# Patient Record
Sex: FEMALE | Race: WHITE | NOT HISPANIC OR LATINO | URBAN - METROPOLITAN AREA
[De-identification: names, ages, dates, MRNs, and addresses within clinical notes are randomized per-mention and may not be internally consistent; named-entity substitution may affect disease eponyms.]

---

## 2025-03-24 ENCOUNTER — HOSPITAL ENCOUNTER (OUTPATIENT)
Dept: RADIOLOGY | Facility: CLINIC | Age: 55
Discharge: HOME | End: 2025-03-24
Payer: COMMERCIAL

## 2025-03-24 ENCOUNTER — APPOINTMENT (OUTPATIENT)
Dept: PRIMARY CARE | Facility: CLINIC | Age: 55
End: 2025-03-24

## 2025-03-24 VITALS
OXYGEN SATURATION: 97 % | HEART RATE: 89 BPM | WEIGHT: 178 LBS | DIASTOLIC BLOOD PRESSURE: 80 MMHG | BODY MASS INDEX: 29.66 KG/M2 | HEIGHT: 65 IN | SYSTOLIC BLOOD PRESSURE: 139 MMHG

## 2025-03-24 VITALS — HEIGHT: 65 IN | WEIGHT: 174 LBS | BODY MASS INDEX: 28.99 KG/M2

## 2025-03-24 DIAGNOSIS — Z12.31 VISIT FOR SCREENING MAMMOGRAM: ICD-10-CM

## 2025-03-24 DIAGNOSIS — R03.0 BLOOD PRESSURE ELEVATED WITHOUT HISTORY OF HTN: ICD-10-CM

## 2025-03-24 DIAGNOSIS — Z00.00 HEALTH CARE MAINTENANCE: Primary | ICD-10-CM

## 2025-03-24 DIAGNOSIS — Z12.11 ENCOUNTER FOR SCREENING FOR MALIGNANT NEOPLASM OF COLON: ICD-10-CM

## 2025-03-24 DIAGNOSIS — R22.2 SUPRACLAVICULAR FOSSA FULLNESS: ICD-10-CM

## 2025-03-24 PROCEDURE — 77067 SCR MAMMO BI INCL CAD: CPT | Performed by: RADIOLOGY

## 2025-03-24 PROCEDURE — 77063 BREAST TOMOSYNTHESIS BI: CPT

## 2025-03-24 PROCEDURE — 71046 X-RAY EXAM CHEST 2 VIEWS: CPT

## 2025-03-24 PROCEDURE — 77063 BREAST TOMOSYNTHESIS BI: CPT | Performed by: RADIOLOGY

## 2025-03-24 PROCEDURE — 99386 PREV VISIT NEW AGE 40-64: CPT | Performed by: INTERNAL MEDICINE

## 2025-03-24 PROCEDURE — 3008F BODY MASS INDEX DOCD: CPT | Performed by: INTERNAL MEDICINE

## 2025-03-24 PROCEDURE — 71046 X-RAY EXAM CHEST 2 VIEWS: CPT | Performed by: RADIOLOGY

## 2025-03-24 ASSESSMENT — ENCOUNTER SYMPTOMS
OCCASIONAL FEELINGS OF UNSTEADINESS: 0
DEPRESSION: 0
LOSS OF SENSATION IN FEET: 0

## 2025-03-24 ASSESSMENT — PATIENT HEALTH QUESTIONNAIRE - PHQ9
2. FEELING DOWN, DEPRESSED OR HOPELESS: NOT AT ALL
SUM OF ALL RESPONSES TO PHQ9 QUESTIONS 1 AND 2: 0
1. LITTLE INTEREST OR PLEASURE IN DOING THINGS: NOT AT ALL

## 2025-03-24 NOTE — PROGRESS NOTES
"Subjective   Patient ID: Sindhu Giraldo is a 54 y.o. female who presents for New Patient Visit (New pt is here to establish care. ).    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 bowels normal no dysuria has some fullness in her left upper chest and back of her neck no polyuria polydipsia bones muscles joints okay    Past medical history unremarkable    Medications none except sometimes vitamins    Allergies no known drug allergies    Social history former light smoker decades ago alcohol 3 drinks per week    Family history mother diabetes Father Parkinson's disease    Prevention has not had mammogram recently no colonoscopy some exercise no recent blood work    Review of Systems    Objective   BP (!) 162/94   Pulse 89   Ht 1.638 m (5' 4.5\")   Wt 80.7 kg (178 lb)   SpO2 97%   BMI 30.08 kg/m²     Physical Exam  Vital signs noted alert and oriented x 3 NCAT PERRLA EOMI nares without discharge OP benign TM normal bilateral EAC clear bilateral no AC nodes no JVD or bruit no thyromegaly chest clear to auscultation and percussion CV regular rate and rhythm S1-S2 without murmur gallop or rub abdomen soft nontender normal active bowel sounds LS spine normal curvature negative straight leg raise extremities no clubbing cyanosis or edema normal distal pulses DTR 2+ musculoskeletal skin there is some fullness to the left supraclavicular fossa and somewhat of a pad over C7  Assessment/Plan   {Assess/PlanSmartLinks:44251} impression General Medical examination supraclavicular fossa swelling on the left elevated blood pressure  Plan okay for chest x-ray requisition made okay for mammogram requisition made okay for colonoscopy requisition made check comprehensive panel advised on glucose potassium and kidney function as well as liver function good diet regular " exercise good water consumption follow-up GYN check CBC advised on blood count check TSH advised on thyroid and metabolism check lipid panel advised on cholesterol and cholesterol profile then follow-up after scan and blood work and for regular visit 1 to 3 months for the blood pressure while watching salt TT 50 cc 26

## 2025-03-25 ENCOUNTER — HOSPITAL ENCOUNTER (OUTPATIENT)
Dept: RADIOLOGY | Facility: EXTERNAL LOCATION | Age: 55
Discharge: HOME | End: 2025-03-25

## 2025-03-25 LAB
ALBUMIN SERPL-MCNC: 4.6 G/DL (ref 3.6–5.1)
ALP SERPL-CCNC: 71 U/L (ref 37–153)
ALT SERPL-CCNC: 14 U/L (ref 6–29)
ANION GAP SERPL CALCULATED.4IONS-SCNC: 9 MMOL/L (CALC) (ref 7–17)
AST SERPL-CCNC: 16 U/L (ref 10–35)
BILIRUB SERPL-MCNC: 0.4 MG/DL (ref 0.2–1.2)
BUN SERPL-MCNC: 11 MG/DL (ref 7–25)
CALCIUM SERPL-MCNC: 9.6 MG/DL (ref 8.6–10.4)
CHLORIDE SERPL-SCNC: 104 MMOL/L (ref 98–110)
CHOLEST SERPL-MCNC: 244 MG/DL
CHOLEST/HDLC SERPL: 3.3 (CALC)
CO2 SERPL-SCNC: 28 MMOL/L (ref 20–32)
CREAT SERPL-MCNC: 0.64 MG/DL (ref 0.5–1.03)
EGFRCR SERPLBLD CKD-EPI 2021: 106 ML/MIN/1.73M2
ERYTHROCYTE [DISTWIDTH] IN BLOOD BY AUTOMATED COUNT: 12.8 % (ref 11–15)
GLUCOSE SERPL-MCNC: 145 MG/DL (ref 65–139)
HCT VFR BLD AUTO: 42.9 % (ref 35–45)
HDLC SERPL-MCNC: 74 MG/DL
HGB BLD-MCNC: 14.7 G/DL (ref 11.7–15.5)
LDLC SERPL CALC-MCNC: 136 MG/DL (CALC)
MCH RBC QN AUTO: 30.6 PG (ref 27–33)
MCHC RBC AUTO-ENTMCNC: 34.3 G/DL (ref 32–36)
MCV RBC AUTO: 89.2 FL (ref 80–100)
NONHDLC SERPL-MCNC: 170 MG/DL (CALC)
PLATELET # BLD AUTO: 244 THOUSAND/UL (ref 140–400)
PMV BLD REES-ECKER: 10.5 FL (ref 7.5–12.5)
POTASSIUM SERPL-SCNC: 4.1 MMOL/L (ref 3.5–5.3)
PROT SERPL-MCNC: 7.3 G/DL (ref 6.1–8.1)
RBC # BLD AUTO: 4.81 MILLION/UL (ref 3.8–5.1)
SODIUM SERPL-SCNC: 141 MMOL/L (ref 135–146)
TRIGL SERPL-MCNC: 200 MG/DL
TSH SERPL-ACNC: 1.32 MIU/L
WBC # BLD AUTO: 6 THOUSAND/UL (ref 3.8–10.8)

## 2025-04-03 ENCOUNTER — OFFICE VISIT (OUTPATIENT)
Dept: PRIMARY CARE | Facility: CLINIC | Age: 55
End: 2025-04-03
Payer: COMMERCIAL

## 2025-04-03 VITALS
DIASTOLIC BLOOD PRESSURE: 76 MMHG | SYSTOLIC BLOOD PRESSURE: 131 MMHG | BODY MASS INDEX: 30.59 KG/M2 | WEIGHT: 181 LBS | OXYGEN SATURATION: 99 % | HEART RATE: 83 BPM

## 2025-04-03 DIAGNOSIS — R22.2 SUPRACLAVICULAR FOSSA FULLNESS: Primary | ICD-10-CM

## 2025-04-03 DIAGNOSIS — Z23 NEED FOR VACCINATION: ICD-10-CM

## 2025-04-03 DIAGNOSIS — R73.02 GLUCOSE INTOLERANCE (IMPAIRED GLUCOSE TOLERANCE): ICD-10-CM

## 2025-04-03 DIAGNOSIS — E78.2 MIXED HYPERLIPIDEMIA: ICD-10-CM

## 2025-04-03 PROCEDURE — 90715 TDAP VACCINE 7 YRS/> IM: CPT | Performed by: INTERNAL MEDICINE

## 2025-04-03 PROCEDURE — 90471 IMMUNIZATION ADMIN: CPT | Performed by: INTERNAL MEDICINE

## 2025-04-03 PROCEDURE — 99213 OFFICE O/P EST LOW 20 MIN: CPT | Performed by: INTERNAL MEDICINE

## 2025-04-03 ASSESSMENT — PATIENT HEALTH QUESTIONNAIRE - PHQ9
SUM OF ALL RESPONSES TO PHQ9 QUESTIONS 1 AND 2: 0
2. FEELING DOWN, DEPRESSED OR HOPELESS: NOT AT ALL
1. LITTLE INTEREST OR PLEASURE IN DOING THINGS: NOT AT ALL

## 2025-04-03 NOTE — PROGRESS NOTES
Subjective   Patient ID: Sindhu Giraldo is a 54 y.o. female who presents for Follow-up.    HPI follow-up visit reviewed recent blood work results with elevated blood sugar there is a strong family history of diabetes no chest pain no shortness of breath still has the supraclavicular fossa soft swelling there chest x-ray was unremarkable also noted was the lipids she has not been exercising discussed with dietary interventions    Review of Systems    Objective   /83   Pulse 83   Wt 82.1 kg (181 lb)   LMP 03/04/2025   SpO2 99%   BMI 30.59 kg/m²   Vital signs noted alert and oriented x 3 NCAT no JVD there is a soft swelling above the supraclavicular fossa almost jellylike in consistency very light there are no breath sounds in this area chest clear to auscultation CV regular rate and rhythm S1-S2 extremities no clubbing cyanosis or edema normal distal pulses  Physical Exam    Assessment/Plan   {Assess/PlanSmartLinks:40108} impression elevated blood sugar elevated cholesterol supraclavicular fossa  Plan May consider ultrasound finding requisition to be made for the left supraclavicular fossa and/or follow-up with ENT or pulmonary?  Good diet regular exercise good water consumption Mediterranean diet with increased exercise and then will recheck blood sugar and lipids in the future okay for DTaP by request given by TUSHAR Mcdaniel today and then follow-up based on ultrasound and in 3 months

## 2025-04-07 ENCOUNTER — APPOINTMENT (OUTPATIENT)
Dept: PRIMARY CARE | Facility: CLINIC | Age: 55
End: 2025-04-07
Payer: COMMERCIAL

## 2025-04-17 ENCOUNTER — HOSPITAL ENCOUNTER (OUTPATIENT)
Dept: RADIOLOGY | Facility: CLINIC | Age: 55
Discharge: HOME | End: 2025-04-17
Payer: COMMERCIAL

## 2025-04-17 ENCOUNTER — APPOINTMENT (OUTPATIENT)
Dept: RADIOLOGY | Facility: CLINIC | Age: 55
End: 2025-04-17
Payer: COMMERCIAL

## 2025-04-17 DIAGNOSIS — R22.2 SUPRACLAVICULAR FOSSA FULLNESS: ICD-10-CM

## 2025-04-17 PROCEDURE — 76536 US EXAM OF HEAD AND NECK: CPT

## 2025-04-17 PROCEDURE — 76536 US EXAM OF HEAD AND NECK: CPT | Performed by: STUDENT IN AN ORGANIZED HEALTH CARE EDUCATION/TRAINING PROGRAM
